# Patient Record
Sex: FEMALE
[De-identification: names, ages, dates, MRNs, and addresses within clinical notes are randomized per-mention and may not be internally consistent; named-entity substitution may affect disease eponyms.]

---

## 2018-09-12 ENCOUNTER — HOSPITAL ENCOUNTER (OUTPATIENT)
Dept: HOSPITAL 90 - OIH | Age: 68
Discharge: HOME | End: 2018-09-12
Attending: INTERNAL MEDICINE
Payer: SELF-PAY

## 2018-09-12 DIAGNOSIS — E11.9: ICD-10-CM

## 2018-09-12 DIAGNOSIS — I10: ICD-10-CM

## 2018-09-12 DIAGNOSIS — Z13.6: Primary | ICD-10-CM

## 2018-09-12 DIAGNOSIS — M19.90: ICD-10-CM

## 2018-09-12 DIAGNOSIS — E78.5: ICD-10-CM

## 2018-09-12 DIAGNOSIS — E03.9: ICD-10-CM

## 2018-09-12 PROCEDURE — 75571 CT HRT W/O DYE W/CA TEST: CPT

## 2018-09-26 ENCOUNTER — HOSPITAL ENCOUNTER (OUTPATIENT)
Dept: HOSPITAL 90 - SHCH | Age: 68
Discharge: HOME | End: 2018-09-26
Attending: INTERNAL MEDICINE
Payer: MEDICARE

## 2018-09-26 DIAGNOSIS — I10: ICD-10-CM

## 2018-09-26 DIAGNOSIS — E11.9: ICD-10-CM

## 2018-09-26 DIAGNOSIS — M19.90: ICD-10-CM

## 2018-09-26 DIAGNOSIS — E78.5: ICD-10-CM

## 2018-09-26 DIAGNOSIS — I73.9: Primary | ICD-10-CM

## 2018-09-26 PROCEDURE — 93925 LOWER EXTREMITY STUDY: CPT

## 2018-10-16 ENCOUNTER — HOSPITAL ENCOUNTER (OUTPATIENT)
Dept: HOSPITAL 90 - RAH | Age: 68
Discharge: HOME | End: 2018-10-16
Attending: INTERNAL MEDICINE
Payer: MEDICARE

## 2018-10-16 DIAGNOSIS — M47.896: Primary | ICD-10-CM

## 2018-10-16 DIAGNOSIS — M25.78: ICD-10-CM

## 2018-10-16 PROCEDURE — 72148 MRI LUMBAR SPINE W/O DYE: CPT

## 2020-05-13 ENCOUNTER — HOSPITAL ENCOUNTER (OUTPATIENT)
Dept: HOSPITAL 90 - SHCH | Age: 70
Discharge: HOME | End: 2020-05-13
Attending: INTERNAL MEDICINE
Payer: MEDICARE

## 2020-05-13 DIAGNOSIS — I73.9: ICD-10-CM

## 2020-05-13 DIAGNOSIS — I65.23: ICD-10-CM

## 2020-05-13 DIAGNOSIS — I10: ICD-10-CM

## 2020-05-13 DIAGNOSIS — K55.059: Primary | ICD-10-CM

## 2020-05-13 DIAGNOSIS — I15.0: ICD-10-CM

## 2020-05-13 PROCEDURE — 93925 LOWER EXTREMITY STUDY: CPT

## 2020-05-13 PROCEDURE — 93978 VASCULAR STUDY: CPT

## 2020-05-13 PROCEDURE — 93880 EXTRACRANIAL BILAT STUDY: CPT

## 2020-05-13 PROCEDURE — 93356 MYOCRD STRAIN IMG SPCKL TRCK: CPT

## 2020-05-13 PROCEDURE — 93975 VASCULAR STUDY: CPT

## 2020-05-13 PROCEDURE — 93306 TTE W/DOPPLER COMPLETE: CPT

## 2020-05-15 ENCOUNTER — HOSPITAL ENCOUNTER (OUTPATIENT)
Dept: HOSPITAL 90 - SHCH | Age: 70
Discharge: HOME | End: 2020-05-15
Attending: INTERNAL MEDICINE
Payer: MEDICARE

## 2020-05-15 DIAGNOSIS — R07.9: ICD-10-CM

## 2020-05-15 DIAGNOSIS — I10: Primary | ICD-10-CM

## 2020-05-15 PROCEDURE — 93017 CV STRESS TEST TRACING ONLY: CPT

## 2020-05-15 PROCEDURE — 78452 HT MUSCLE IMAGE SPECT MULT: CPT

## 2020-05-15 PROCEDURE — 96374 THER/PROPH/DIAG INJ IV PUSH: CPT

## 2020-11-11 LAB
APTT PPP: 26.3 SEC (ref 26.3–35.5)
BASOPHILS NFR BLD AUTO: 0.7 % (ref 0–5)
BUN SERPL-MCNC: 21 MG/DL (ref 7–18)
CHLORIDE SERPL-SCNC: 104 MMOL/L (ref 101–111)
CO2 SERPL-SCNC: 27 MMOL/L (ref 21–32)
CREAT SERPL-MCNC: 0.9 MG/DL (ref 0.5–1.5)
DEPRECATED SQUAMOUS URNS QL MICRO: (no result) /HPF (ref 0–2)
EOSINOPHIL NFR BLD AUTO: 1.7 % (ref 0–8)
ERYTHROCYTE [DISTWIDTH] IN BLOOD BY AUTOMATED COUNT: 13.6 % (ref 11–15.5)
GFR SERPL CREATININE-BSD FRML MDRD: 66 ML/MIN (ref 60–?)
GLUCOSE SERPL-MCNC: 93 MG/DL (ref 70–105)
HCT VFR BLD AUTO: 38 % (ref 36–48)
INR PPP: 0.88 (ref 0.85–1.15)
LYMPHOCYTES NFR SPEC AUTO: 34.4 % (ref 21–51)
MCH RBC QN AUTO: 28.8 PG (ref 27–33)
MCHC RBC AUTO-ENTMCNC: 32.1 G/DL (ref 32–36)
MCV RBC AUTO: 89.8 FL (ref 79–99)
MONOCYTES NFR BLD AUTO: 5.4 % (ref 3–13)
NEUTROPHILS NFR BLD AUTO: 57.5 % (ref 40–77)
NRBC BLD MANUAL-RTO: 0 % (ref 0–0.19)
PH UR STRIP: 5 [PH] (ref 5–8)
PLATELET # BLD AUTO: 308 K/UL (ref 130–400)
POTASSIUM SERPL-SCNC: 4.9 MMOL/L (ref 3.5–5.1)
PROTHROMBIN TIME: 9.5 SEC (ref 9.6–11.6)
RBC # BLD AUTO: 4.23 MIL/UL (ref 4–5.5)
SODIUM SERPL-SCNC: 138 MMOL/L (ref 136–145)
SP GR UR STRIP: 1.01 (ref 1–1.03)
UROBILINOGEN UR STRIP-MCNC: 0.2 MG/DL (ref 0.2–1)
WBC # BLD AUTO: 10.6 K/UL (ref 4.8–10.8)
WBC #/AREA URNS HPF: (no result) /HPF (ref 0–1)

## 2020-11-13 ENCOUNTER — HOSPITAL ENCOUNTER (OUTPATIENT)
Dept: HOSPITAL 90 - DAH | Age: 70
Discharge: HOME | End: 2020-11-13
Attending: INTERNAL MEDICINE
Payer: MEDICARE

## 2020-11-13 VITALS — DIASTOLIC BLOOD PRESSURE: 58 MMHG | SYSTOLIC BLOOD PRESSURE: 133 MMHG

## 2020-11-13 VITALS — DIASTOLIC BLOOD PRESSURE: 64 MMHG | SYSTOLIC BLOOD PRESSURE: 136 MMHG

## 2020-11-13 VITALS — DIASTOLIC BLOOD PRESSURE: 65 MMHG | SYSTOLIC BLOOD PRESSURE: 130 MMHG

## 2020-11-13 VITALS — SYSTOLIC BLOOD PRESSURE: 139 MMHG | DIASTOLIC BLOOD PRESSURE: 61 MMHG

## 2020-11-13 VITALS — HEIGHT: 64 IN | WEIGHT: 161.2 LBS | BODY MASS INDEX: 27.52 KG/M2

## 2020-11-13 VITALS — DIASTOLIC BLOOD PRESSURE: 67 MMHG | SYSTOLIC BLOOD PRESSURE: 141 MMHG

## 2020-11-13 VITALS — DIASTOLIC BLOOD PRESSURE: 57 MMHG | SYSTOLIC BLOOD PRESSURE: 117 MMHG

## 2020-11-13 VITALS — DIASTOLIC BLOOD PRESSURE: 60 MMHG | SYSTOLIC BLOOD PRESSURE: 130 MMHG

## 2020-11-13 VITALS — SYSTOLIC BLOOD PRESSURE: 136 MMHG | DIASTOLIC BLOOD PRESSURE: 65 MMHG

## 2020-11-13 VITALS — SYSTOLIC BLOOD PRESSURE: 136 MMHG | DIASTOLIC BLOOD PRESSURE: 64 MMHG

## 2020-11-13 VITALS — SYSTOLIC BLOOD PRESSURE: 133 MMHG | DIASTOLIC BLOOD PRESSURE: 63 MMHG

## 2020-11-13 DIAGNOSIS — I10: ICD-10-CM

## 2020-11-13 DIAGNOSIS — Z79.899: ICD-10-CM

## 2020-11-13 DIAGNOSIS — Z98.890: ICD-10-CM

## 2020-11-13 DIAGNOSIS — K55.1: ICD-10-CM

## 2020-11-13 DIAGNOSIS — Z90.710: ICD-10-CM

## 2020-11-13 DIAGNOSIS — E03.9: ICD-10-CM

## 2020-11-13 DIAGNOSIS — I25.118: Primary | ICD-10-CM

## 2020-11-13 DIAGNOSIS — Z79.82: ICD-10-CM

## 2020-11-13 DIAGNOSIS — Z79.01: ICD-10-CM

## 2020-11-13 DIAGNOSIS — E11.9: ICD-10-CM

## 2020-11-13 PROCEDURE — 82948 REAGENT STRIP/BLOOD GLUCOSE: CPT

## 2020-11-13 PROCEDURE — 99157 MOD SED OTHER PHYS/QHP EA: CPT

## 2020-11-13 PROCEDURE — 71045 X-RAY EXAM CHEST 1 VIEW: CPT

## 2020-11-13 PROCEDURE — 93458 L HRT ARTERY/VENTRICLE ANGIO: CPT

## 2020-11-13 PROCEDURE — 99156 MOD SED OTH PHYS/QHP 5/>YRS: CPT

## 2020-11-13 PROCEDURE — 85730 THROMBOPLASTIN TIME PARTIAL: CPT

## 2020-11-13 PROCEDURE — 93005 ELECTROCARDIOGRAM TRACING: CPT

## 2020-11-13 PROCEDURE — 36245 INS CATH ABD/L-EXT ART 1ST: CPT

## 2020-11-13 PROCEDURE — 85025 COMPLETE CBC W/AUTO DIFF WBC: CPT

## 2020-11-13 PROCEDURE — 75726 ARTERY X-RAYS ABDOMEN: CPT

## 2020-11-13 PROCEDURE — 81001 URINALYSIS AUTO W/SCOPE: CPT

## 2020-11-13 PROCEDURE — 36415 COLL VENOUS BLD VENIPUNCTURE: CPT

## 2020-11-13 PROCEDURE — 85610 PROTHROMBIN TIME: CPT

## 2020-11-13 PROCEDURE — 80048 BASIC METABOLIC PNL TOTAL CA: CPT

## 2020-11-13 NOTE — NUR
pt discharged home with son. printed and verbal discharge instructions given. verbalizes 
understanding. iv removed. no acute distress noted. no hematoma or bleeding to rt groin. up 
to bathroom to void. to car via wheelchair

## 2020-11-13 NOTE — NUR
PT BACK FROM CATH LAB. RT GROIN PERCLOSE INTACT-NO HEMATOMA OR BLEEDING NOTED. NO CHEST PAIN 
OR SOB. SON AT BEDSIDE

## 2021-10-13 ENCOUNTER — HOSPITAL ENCOUNTER (OUTPATIENT)
Dept: HOSPITAL 90 - SHCH | Age: 71
Discharge: HOME | End: 2021-10-13
Attending: INTERNAL MEDICINE
Payer: MEDICARE

## 2021-10-13 DIAGNOSIS — I65.23: Primary | ICD-10-CM

## 2021-10-13 DIAGNOSIS — I51.7: ICD-10-CM

## 2021-10-13 DIAGNOSIS — R01.1: ICD-10-CM

## 2021-10-13 DIAGNOSIS — I10: ICD-10-CM

## 2021-10-13 PROCEDURE — 93880 EXTRACRANIAL BILAT STUDY: CPT

## 2021-10-13 PROCEDURE — 93356 MYOCRD STRAIN IMG SPCKL TRCK: CPT

## 2021-10-13 PROCEDURE — 93306 TTE W/DOPPLER COMPLETE: CPT

## 2021-11-22 ENCOUNTER — HOSPITAL ENCOUNTER (OUTPATIENT)
Dept: HOSPITAL 90 - LAB | Age: 71
Discharge: HOME | End: 2021-11-22
Attending: INTERNAL MEDICINE
Payer: MEDICARE

## 2021-11-22 DIAGNOSIS — I73.9: Primary | ICD-10-CM

## 2021-11-22 LAB
BUN SERPL-MCNC: 23 MG/DL (ref 7–18)
CREAT SERPL-MCNC: 0.9 MG/DL (ref 0.5–1.5)
GFR SERPL CREATININE-BSD FRML MDRD: 66 ML/MIN (ref 60–?)

## 2021-11-22 PROCEDURE — 36415 COLL VENOUS BLD VENIPUNCTURE: CPT

## 2021-11-22 PROCEDURE — 82565 ASSAY OF CREATININE: CPT

## 2021-11-22 PROCEDURE — 84520 ASSAY OF UREA NITROGEN: CPT

## 2021-11-23 ENCOUNTER — HOSPITAL ENCOUNTER (OUTPATIENT)
Dept: HOSPITAL 90 - RAH | Age: 71
Discharge: HOME | End: 2021-11-23
Attending: INTERNAL MEDICINE
Payer: MEDICARE

## 2021-11-23 DIAGNOSIS — I70.203: Primary | ICD-10-CM

## 2021-11-23 PROCEDURE — 75635 CT ANGIO ABDOMINAL ARTERIES: CPT
